# Patient Record
Sex: FEMALE | Race: WHITE | NOT HISPANIC OR LATINO
[De-identification: names, ages, dates, MRNs, and addresses within clinical notes are randomized per-mention and may not be internally consistent; named-entity substitution may affect disease eponyms.]

---

## 2023-12-27 ENCOUNTER — APPOINTMENT (OUTPATIENT)
Dept: ORTHOPEDIC SURGERY | Facility: CLINIC | Age: 63
End: 2023-12-27
Payer: COMMERCIAL

## 2023-12-27 VITALS — BODY MASS INDEX: 21.68 KG/M2 | WEIGHT: 127 LBS | HEIGHT: 64 IN

## 2023-12-27 PROCEDURE — 99203 OFFICE O/P NEW LOW 30 MIN: CPT | Mod: 25

## 2023-12-27 PROCEDURE — 29125 APPL SHORT ARM SPLINT STATIC: CPT

## 2023-12-27 PROCEDURE — 73130 X-RAY EXAM OF HAND: CPT | Mod: LT

## 2023-12-27 NOTE — PHYSICAL EXAM
[Left] : left hand [Palm] : palm [5th Finger] : 5th finger [5th] : 5th [MCP Joint] : MCP joint [Proximal Phalanx] : proximal phalanx [] : good active flexion and extension of all finger joints [Finger Flexion] : finger flexion

## 2023-12-27 NOTE — HISTORY OF PRESENT ILLNESS
[8] : 8 [7] : 7 [de-identified] : 12/27/23 - 64 yo LHD female with left hand pain since 12/23/23. She states she fell while playing pickeball and landing with her racket in hand. She ice and took Advil. She has swelling and bruising. [FreeTextEntry1] : lt hand/ lt pinky

## 2023-12-27 NOTE — ASSESSMENT
[FreeTextEntry1] : [facility] xrays. Ulnar gutter splint applied. Elevate, NSAIDS prn. Follow up with Dr. Rodriguez.

## 2023-12-28 ENCOUNTER — APPOINTMENT (OUTPATIENT)
Dept: ORTHOPEDIC SURGERY | Facility: CLINIC | Age: 63
End: 2023-12-28
Payer: COMMERCIAL

## 2023-12-28 VITALS — WEIGHT: 127 LBS | HEIGHT: 64 IN | BODY MASS INDEX: 21.68 KG/M2

## 2023-12-28 PROCEDURE — 29280 STRAPPING OF HAND OR FINGER: CPT | Mod: F4

## 2023-12-28 PROCEDURE — 99202 OFFICE O/P NEW SF 15 MIN: CPT | Mod: 25

## 2023-12-28 NOTE — HISTORY OF PRESENT ILLNESS
[8] : 8 [7] : 7 [de-identified] : 12/28/2023: pt here for f/u s/p left small finger P1 fracture. Pt presents with ulnar gutter splint   UC note below:  12/27/23 - 64 yo LHD female with left hand pain since 12/23/23. She states she fell while playing pickeball and landing with her racket in hand. She ice and took Advil. She has swelling and bruising. [FreeTextEntry1] : lt hand/ lt pinky

## 2023-12-28 NOTE — IMAGING
[de-identified] : left hand with small finger swelling and pain. TTP over the P1 region with normal cascade. all digits are nvi with intact FDP, FDS and extensor tendon function. Left wrist with full and pain free ROM / no ttp.  xrays shows: impacted P1 small finger with mild ulnar deviation

## 2023-12-28 NOTE — ASSESSMENT
[FreeTextEntry1] : The patient was advised of the diagnosis. The natural history of the pathology was explained in full to the patient in layman's terms. All questions were answered. The risks and benefits of surgical and non-surgical treatment alternatives were explained in full to the patient. The affected finger is noted to be clean and dry.  A pam loop was applied to the affected finger over the middle phalanx. It was then strapped to the adjacent finger in overlapping fashion. Fit and pressure were assessed as the strapping was applied and stopped when the desired amount of support was achieved We reviewed the importance of finger range of motion. We discussed that while wrist stiffness can be tolerated, finger stiffness causes grave dysfunction and is difficult to overcome once it sets in. The patient was encouraged to engage in finger range of motion exercises. A home exercise program was demonstrated and instructions given to begin immediately and to perform the exercises hourly.   Pt will maintain left small and ring finger strap x 6 weeks. RTO in 2 weeks for xray and examination

## 2024-01-10 ENCOUNTER — APPOINTMENT (OUTPATIENT)
Dept: ORTHOPEDIC SURGERY | Facility: CLINIC | Age: 64
End: 2024-01-10
Payer: COMMERCIAL

## 2024-01-10 VITALS — WEIGHT: 127 LBS | BODY MASS INDEX: 21.68 KG/M2 | HEIGHT: 64 IN

## 2024-01-10 PROCEDURE — 29280 STRAPPING OF HAND OR FINGER: CPT | Mod: LT

## 2024-01-10 PROCEDURE — 73140 X-RAY EXAM OF FINGER(S): CPT | Mod: LT

## 2024-01-10 PROCEDURE — 99213 OFFICE O/P EST LOW 20 MIN: CPT | Mod: 25

## 2024-01-10 NOTE — HISTORY OF PRESENT ILLNESS
[8] : 8 [7] : 7 [de-identified] : 1/10/2024:  pt here for 3 week f/u s/p left small finger P1 fracture. pt states pain has improved and she has continued use of pam loops as instructed.  12/28/2023: pt here for f/u s/p left small finger P1 fracture. Pt presents with ulnar gutter splint   UC note below:  12/27/23 - 64 yo LHD female with left hand pain since 12/23/23. She states she fell while playing pickeball and landing with her racket in hand. She ice and took Advil. She has swelling and bruising. [FreeTextEntry1] : lt hand/ lt pinky

## 2024-01-10 NOTE — ASSESSMENT
[FreeTextEntry1] : The patient was advised of the diagnosis. The natural history of the pathology was explained in full to the patient in layman's terms. All questions were answered. The risks and benefits of surgical and non-surgical treatment alternatives were explained in full to the patient.  Pt will continue with pam loop x 3 weeks. RTO in 3 weeks for left small finger xray and examination. Pam loops provided today.   The affected finger is noted to be clean and dry.  A pam loop was applied to the affected finger over the middle phalanx. It was then strapped to the adjacent finger in overlapping fashion. Fit and pressure were assessed as the strapping was applied and stopped when the desired amount of support was achieved  We reviewed the importance of finger range of motion. We discussed that while wrist stiffness can be tolerated, finger stiffness causes grave dysfunction and is difficult to overcome once it sets in. The patient was encouraged to engage in finger range of motion exercises. A home exercise program was demonstrated and instructions given to begin immediately and to perform the exercises hourly.   Pt will maintain left small and ring finger strap x 6 weeks. RTO in 2 weeks for xray and examination

## 2024-01-10 NOTE — IMAGING
[de-identified] : left hand with small finger swelling and mild ttp over the P1 region normal cascade. all digits are nvi with intact FDP, FDS and extensor tendon function. Left wrist with full and pain free ROM / no ttp.  xrays shows: impacted P1 small finger with mild ulnar deviation. There is no change from prior xray

## 2024-02-07 ENCOUNTER — APPOINTMENT (OUTPATIENT)
Dept: ORTHOPEDIC SURGERY | Facility: CLINIC | Age: 64
End: 2024-02-07
Payer: COMMERCIAL

## 2024-02-07 VITALS — HEIGHT: 64 IN | WEIGHT: 127 LBS | BODY MASS INDEX: 21.68 KG/M2

## 2024-02-07 DIAGNOSIS — S62.617A DISPLACED FRACTURE OF PROXIMAL PHALANX OF LEFT LITTLE FINGER, INITIAL ENCOUNTER FOR CLOSED FRACTURE: ICD-10-CM

## 2024-02-07 PROCEDURE — 99213 OFFICE O/P EST LOW 20 MIN: CPT

## 2024-02-07 PROCEDURE — 73140 X-RAY EXAM OF FINGER(S): CPT | Mod: LT

## 2024-02-07 NOTE — IMAGING
[de-identified] : left hand with small finger swelling and mild ttp over the P1 region normal cascade. all digits are nvi with intact FDP, FDS and extensor tendon function. limited extension actively Left wrist with full and pain free ROM / no ttp.  xrays shows: impacted P1 small finger with mild ulnar deviation. There is no change from prior xray

## 2024-02-07 NOTE — ASSESSMENT
[FreeTextEntry1] : The patient was advised of the diagnosis. The natural history of the pathology was explained in full to the patient in layman's terms. All questions were answered. The risks and benefits of surgical and non-surgical treatment alternatives were explained in full to the patient.  Start OT night time extension splint F/u in 2 months

## 2024-02-07 NOTE — HISTORY OF PRESENT ILLNESS
[Dull/Aching] : dull/aching [Tightness] : tightness [de-identified] : 2/7/24:  Pt reports that she is doing well but her finger wont fully extend.  1/10/2024:  pt here for 3 week f/u s/p left small finger P1 fracture. pt states pain has improved and she has continued use of pam loops as instructed.  12/28/2023: pt here for f/u s/p left small finger P1 fracture. Pt presents with ulnar gutter splint   UC note below:  12/27/23 - 62 yo LHD female with left hand pain since 12/23/23. She states she fell while playing [employer] and landing with her racket in hand. She ice and took Advil. She has swelling and bruising. [FreeTextEntry1] : lt hand/ lt pinky

## 2024-04-17 ENCOUNTER — APPOINTMENT (OUTPATIENT)
Dept: ORTHOPEDIC SURGERY | Facility: CLINIC | Age: 64
End: 2024-04-17